# Patient Record
Sex: FEMALE | URBAN - METROPOLITAN AREA
[De-identification: names, ages, dates, MRNs, and addresses within clinical notes are randomized per-mention and may not be internally consistent; named-entity substitution may affect disease eponyms.]

---

## 2023-03-16 ENCOUNTER — OFFICE (OUTPATIENT)
Dept: URBAN - METROPOLITAN AREA CLINIC 105 | Facility: CLINIC | Age: 23
End: 2023-03-16

## 2023-03-16 VITALS
DIASTOLIC BLOOD PRESSURE: 75 MMHG | WEIGHT: 191 LBS | SYSTOLIC BLOOD PRESSURE: 100 MMHG | HEIGHT: 64 IN | HEART RATE: 78 BPM

## 2023-03-16 DIAGNOSIS — R63.5 ABNORMAL WEIGHT GAIN: ICD-10-CM

## 2023-03-16 DIAGNOSIS — F17.200 NICOTINE DEPENDENCE, UNSPECIFIED, UNCOMPLICATED: ICD-10-CM

## 2023-03-16 DIAGNOSIS — R10.31 RIGHT LOWER QUADRANT PAIN: ICD-10-CM

## 2023-03-16 DIAGNOSIS — K59.09 OTHER CONSTIPATION: ICD-10-CM

## 2023-03-16 DIAGNOSIS — R14.0 ABDOMINAL DISTENSION (GASEOUS): ICD-10-CM

## 2023-03-16 PROCEDURE — 99204 OFFICE O/P NEW MOD 45 MIN: CPT

## 2023-03-16 NOTE — SERVICENOTES
labs to be done
Low fodmop diet
gas x 2 chewable twice a day
exercise and water and increase fiber
diaphragmatic breathing
follow up in 4 wks 0 = independent

## 2023-03-16 NOTE — SERVICEHPINOTES
Kasia Cowan   is seen for an initial visit today.  She has a PMH of PCOS, working with GYN.  She would like to discuss concerns of persistent abdominal bloating, worsening as the day goes on and worse with gluten and dairy. She has cut down on gluten and dairy with improvement also in acne.  Has not fully eliminated.  Her bloating is uncomfortable to lower abdomen.  She tries to eat healthy and exercises regularly but struggles with losing weight.  She reports daily BMs but they are small and feels incomplete evacuation, feels constipated.  Increased gas.  Her mother would like her TSH checked as pt has been gaining weight.  No alarm sx and no fam hx of GI illness or IBD.